# Patient Record
Sex: MALE | Race: WHITE | Employment: STUDENT | URBAN - METROPOLITAN AREA
[De-identification: names, ages, dates, MRNs, and addresses within clinical notes are randomized per-mention and may not be internally consistent; named-entity substitution may affect disease eponyms.]

---
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
## 2018-12-13 ENCOUNTER — OFFICE VISIT (OUTPATIENT)
Dept: ORTHOPEDICS | Facility: CLINIC | Age: 18
End: 2018-12-13
Payer: COMMERCIAL

## 2018-12-13 DIAGNOSIS — S01.81XA LACERATION OF SKIN OF FOREHEAD, INITIAL ENCOUNTER: Primary | ICD-10-CM

## 2018-12-13 DIAGNOSIS — M54.2 CERVICALGIA: ICD-10-CM

## 2018-12-13 RX ORDER — LIDOCAINE HYDROCHLORIDE AND EPINEPHRINE 10; 10 MG/ML; UG/ML
1 INJECTION, SOLUTION INFILTRATION; PERINEURAL
Status: SHIPPED | OUTPATIENT
Start: 2018-12-13

## 2018-12-13 RX ADMIN — LIDOCAINE HYDROCHLORIDE AND EPINEPHRINE 1 ML: 10; 10 INJECTION, SOLUTION INFILTRATION; PERINEURAL at 12:04

## 2018-12-13 NOTE — PROGRESS NOTES
"      SPORTS & ORTHOPEDIC WALK-IN VISIT 12/13/2018    Primary Care Physician: Rica  [employer] swim athlete here with . This morning during swim practice warm ups was underwater not realizing how close to the wall he was when his forehead collided into the wall/corner of the gutter. He continued to swim throughout the practice stating he just had a \"sore\" head. When he took of his cap at the end of the practice, the  noticed a laceration on his forehead. He is slightly concerned about a concussion.    Reason for visit:     What part of your body is injured / painful? Forehead    What caused the injury /pain? Collided with wall/gutter in pool    How long ago did your injury occur or pain begin? today    What are your most bothersome symptoms? Pain    How would you characterize your symptom?  aching    What makes your symptoms better? Nothing    What makes your symptoms worse? NA    Have you been previously seen for this problem? No    Medical History:    Any recent changes to your medical history? No    Any new medication prescribed since last visit? No    Have you had surgery on this body part before? 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 No    Do you have any vision problems? No    Do you have any chest pain or edema? No    Do you have any shortness of breath or wheezing?  No    Do you have stomach problems? No    Do you have any numbness or focal weakness? No    Do you have diabetes? No    Do you have problems with bleeding or clotting? No    Do you have an rashes or other skin lesions? No         "

## 2018-12-13 NOTE — PROGRESS NOTES
CHIEF COMPLAINT:  Pain and WOUND CARE of the Head       HISTORY OF PRESENT ILLNESS  Mr. Mayer is a pleasant 18 year old year old male who presents to clinic today for  evaluation of a possible concussion that occurred today.  Struck his head on edge of pool, forehead laceration.  Notes pressure in his head region.  Additionally has neck pain.      Grade: Sophomore  School: [employer]     Was this injury Sports related?  Yes: Swimming today   What sport were you participating in? Yes: Swimming   What hit your head?  Yes: Underwaters and misjudged the wall.     What type of playing surface were you on? Water  Was injury received during game or during practice? Practice  Loss of consciousness?:No  Retrograde amnesia?: no  Antegrade amnesia?: no  Evaluated by another provider?: No  How did you sleep the night of concussion?:NA  Are you more fatigued during the (school) day than normal? no  Would you and those around you describe your current behavior as back to baseline?: Yes  Assoc sx: Stiff neck.  Pressure in head    MEDICAL HISTORY  Headaches: no  Migraines diagnosed by health care provider: no  Learning disability: no  Psychiatric disorder:no  Seizure disorder: no  Have you ever had a concussion or had any symptoms that may have occurred as a result of a head injury?:No concussino    Past Medical History:  Past Medical History:   Diagnosis Date     Uncomplicated asthma        Current Medications:  Are you taking any medications other than those listed below, including over the counter medications?  No     Current outpatient prescriptions:    Current Outpatient Medications:      desloratadine (CLARINEX) 5 MG tablet, Take 5 mg by mouth daily, Disp: , Rfl:      fluticasone-salmeterol (ADVAIR) 100-50 MCG/DOSE diskus inhaler, Inhale 1 puff into the lungs every 12 hours, Disp: , Rfl:     Allergies:  Associated allergic manifestations: No    Surgical History:  No past surgical history on file.    Social History:  Who  "else lives at home? Roommates 3    Family History:  Migraines:no  Learning disability:no  Psychiatric disorder: no  Seizure disorder: no    REVIEW OF SYSTEMS  CONSTITUTIONAL:NEGATIVE for fever, chills, change in weight  INTEGUMENTARY/SKIN: NEGATIVE for worrisome rashes, moles or lesions  MUSCULOSKELETAL:See HPI above  NEURO: NEGATIVE for weakness, dizziness or paresthesias    Graded Symptom Checklist (0-6):  Headache 0  \"Pressure in Head\" 3  Neck pain 2  Nausea or Vomiting 0  Dizziness 0  Blurred Vision 0  Balance Problems 0  Sensitivity to Light 0  Sensitivity to Noise 0  Feeling slowed down 0  Feeling like \"in a fog\" 0  \"Don't feel right\" 1  Difficulty concentrating 0  Difficulty remembering 0  Fatigue or low energy 0  Confusion 0  Drowsiness 0  Trouble falling asleep 0  More emotional 0  Irritability 0  Sadness 0  Nervous or anxious 0    Total number of symptoms: 3/22  Symptom severity score: 6/132    Do symptoms get worse with physical activity? No  Do symptoms get worse with mental activity? No    There were no vitals taken for this visit.    EXAM  GENERAL APPEARANCE:healthy, alert and no distress   SKIN: no suspicious lesions or rashes  NEURO: Normal strength and tone, mentation intact and speech normal  PSYCH:  mentation appears normal and affect normal/bright  HEENT:  - Head: 3 cm laceration central forehead just caudad to hairline.  - Painful Eye movements: No  - Convergence Testing: Normal (6-8cm)  - Visual Field Testing: normal  - BROOKE:Yes  - EOMI:Yes  - Nystagmus: No  - VOR Testing Vertical: normal  - VOR Testing Horizonontal: normal  - External Ear Canal:Normal  - Tympanic Membranes: deferred  - Oropharynx:Atraumatic  NECK:  Tenderness cervical paraspinal muscles. No midline tenderness  NEUROLOGIC:  - Cranial Nerves 2-12: intact  - Motor:Normal bulk and tone. Strength 5/5 and symmetric throughout upper extremities  - Sensation: Intact to light touch.  - Coordination:       Finger to Nose: normal  - " Balance Testing:      Rhomberg:Normal     Forward Tandem: two errors     Non-dominant single leg stance balance testing: few errors   - Gait: Walk in hallway at normal speed: Able     Immediate recall 15/15  Orientation 5/5  Concentration: Months 1/1, Total concentration   3 word - yes  4-word - yes  5 word - second sequence yes  7-word - second sequence yes    ASSESSMENT/PLAN    Diagnosis:  Laceration of forehead  Cervicalgia    No definitive diagnosis of concussion    Patient with only pressure at head, neck pain on GSC.  I suspect symptoms are secondary to head/neck trauma and less likely concussion at this time.  He will follow-up with his ATC for reevaluation in the upcoming days.  Tylenol PRN.  Ice to cervical region.  Look out for evolving symptom profile. Laceration repair today as procedure below details.     It was a pleasure seeing Frank today.    Large Joint Injection/Arthocentesis  Date/Time: 12/13/2018 12:04 PM  Performed by: Dwight Ortiz DO  Authorized by: Dwight Ortiz DO     Indications comment:  Forehead laceration    Medications:  1 mL lidocaine 1% with EPINEPHrine 1:100,000 1 %-1:927094  Medications comment:  5mL Lidocaine 1% with epinephrine was used for local anesthesia    Procedure discussed: discussed risks, benefits, and alternatives    Consent Given by:  Patient  Timeout: timeout called immediately prior to procedure    Prep: patient was prepped and draped in usual sterile fashion          Procedure - Laceration Repair of Forehead:  The wound was prepped and draped in sterile fashion. Anesthesia was achieved with (5) mL of (1% lidocaine with epinephrine). The wound was irrigated (with 200cc NS) and explored. There were no foreign bodies or devitalized tissue present. The wound was reapproximated with five 5-0 vicryl interrupted sutures.There was excellent reapproximation of the wound edges.The patient tolerated the procedure without complication. Wound was dressed with antibiotic  ointment and gauze.  Instructions provided for care.    Dwight Ortiz DO, [facility]  Primary Care Sports Medicine

## 2018-12-13 NOTE — LETTER
"12/13/2018       RE: Frank Mayer  [address]  [address]  [facility] [address]     Dear Colleague,    Thank you for referring your patient, Frank Mayer, to the [facility] SPORTS AND ORTHOPAEDIC WALK IN CLINIC at [facility]. Please see a copy of my visit note below.          SPORTS & ORTHOPEDIC WALK-IN VISIT 12/13/2018    Primary Care Physician: Rica  [employer] swim athlete here with . This morning during swim practice warm ups was underwater not realizing how close to the wall he was when his forehead collided into the wall/corner of the gutter. He continued to swim throughout the practice stating he just had a \"sore\" head. When he took of his cap at the end of the practice, the  noticed a laceration on his forehead. He is slightly concerned about a concussion.    Reason for visit:     What part of your body is injured / painful? Forehead    What caused the injury /pain? Collided with wall/gutter in pool    How long ago did your injury occur or pain begin? today    What are your most bothersome symptoms? Pain    How would you characterize your symptom?  aching    What makes your symptoms better? Nothing    What makes your symptoms worse? NA    Have you been previously seen for this problem? No    Medical History:    Any recent changes to your medical history? No    Any new medication prescribed since last visit? No    Have you had surgery on this body part before? 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 No    Do you have any vision problems? No    Do you have any chest pain or edema? No    Do you have any shortness of breath or wheezing?  No    Do you have stomach problems? No    Do you have any numbness or focal weakness? No    Do you have diabetes? No    Do you have problems with bleeding or clotting? No    Do you have an " rashes or other skin lesions? No           CHIEF COMPLAINT:  Pain and WOUND CARE of the Head       HISTORY OF PRESENT ILLNESS  Mr. Mayer is a pleasant 18 year old year old male who presents to clinic today for  evaluation of a possible concussion that occurred today.  Struck his head on edge of pool, forehead laceration.  Notes pressure in his head region.  Additionally has neck pain.      Grade: Sophomore  School: [employer]     Was this injury Sports related?  Yes: Swimming today   What sport were you participating in? Yes: Swimming   What hit your head?  Yes: Underwaters and misjudged the wall.     What type of playing surface were you on? Water  Was injury received during game or during practice? Practice  Loss of consciousness?:No  Retrograde amnesia?: no  Antegrade amnesia?: no  Evaluated by another provider?: No  How did you sleep the night of concussion?:NA  Are you more fatigued during the (school) day than normal? no  Would you and those around you describe your current behavior as back to baseline?: Yes  Assoc sx: Stiff neck.  Pressure in head    MEDICAL HISTORY  Headaches: no  Migraines diagnosed by health care provider: no  Learning disability: no  Psychiatric disorder:no  Seizure disorder: no  Have you ever had a concussion or had any symptoms that may have occurred as a result of a head injury?:No concussino    Past Medical History:  Past Medical History:   Diagnosis Date     Uncomplicated asthma        Current Medications:  Are you taking any medications other than those listed below, including over the counter medications?  No     Current outpatient prescriptions:    Current Outpatient Medications:      desloratadine (CLARINEX) 5 MG tablet, Take 5 mg by mouth daily, Disp: , Rfl:      fluticasone-salmeterol (ADVAIR) 100-50 MCG/DOSE diskus inhaler, Inhale 1 puff into the lungs every 12 hours, Disp: , Rfl:     Allergies:  Associated allergic manifestations: No    Surgical History:  No past surgical  "history on file.    Social History:  Who else lives at home? Roommates 3    Family History:  Migraines:no  Learning disability:no  Psychiatric disorder: no  Seizure disorder: no    REVIEW OF SYSTEMS  CONSTITUTIONAL:NEGATIVE for fever, chills, change in weight  INTEGUMENTARY/SKIN: NEGATIVE for worrisome rashes, moles or lesions  MUSCULOSKELETAL:See HPI above  NEURO: NEGATIVE for weakness, dizziness or paresthesias    Graded Symptom Checklist (0-6):  Headache 0  \"Pressure in Head\" 3  Neck pain 2  Nausea or Vomiting 0  Dizziness 0  Blurred Vision 0  Balance Problems 0  Sensitivity to Light 0  Sensitivity to Noise 0  Feeling slowed down 0  Feeling like \"in a fog\" 0  \"Don't feel right\" 1  Difficulty concentrating 0  Difficulty remembering 0  Fatigue or low energy 0  Confusion 0  Drowsiness 0  Trouble falling asleep 0  More emotional 0  Irritability 0  Sadness 0  Nervous or anxious 0    Total number of symptoms: 3/22  Symptom severity score: 6/132    Do symptoms get worse with physical activity? No  Do symptoms get worse with mental activity? No    There were no vitals taken for this visit.    EXAM  GENERAL APPEARANCE:healthy, alert and no distress   SKIN: no suspicious lesions or rashes  NEURO: Normal strength and tone, mentation intact and speech normal  PSYCH:  mentation appears normal and affect normal/bright  HEENT:  - Head: 3 cm laceration central forehead just caudad to hairline.  - Painful Eye movements: No  - Convergence Testing: Normal (6-8cm)  - Visual Field Testing: normal  - BROOKE:Yes  - EOMI:Yes  - Nystagmus: No  - VOR Testing Vertical: normal  - VOR Testing Horizonontal: normal  - External Ear Canal:Normal  - Tympanic Membranes: deferred  - Oropharynx:Atraumatic  NECK:  Tenderness cervical paraspinal muscles. No midline tenderness  NEUROLOGIC:  - Cranial Nerves 2-12: intact  - Motor:Normal bulk and tone. Strength 5/5 and symmetric throughout upper extremities  - Sensation: Intact to light touch.  - " Coordination:       Finger to Nose: normal  - Balance Testing:      Rhomberg:Normal     Forward Tandem: two errors     Non-dominant single leg stance balance testing: few errors   - Gait: Walk in hallway at normal speed: Able     Immediate recall 15/15  Orientation 5/5  Concentration: Months 1/1, Total concentration   3 word - yes  4-word - yes  5 word - second sequence yes  7-word - second sequence yes    ASSESSMENT/PLAN    Diagnosis:  Laceration of forehead  Cervicalgia    No definitive diagnosis of concussion    Patient with only pressure at head, neck pain on GSC.  I suspect symptoms are secondary to head/neck trauma and less likely concussion at this time.  He will follow-up with his ATC for reevaluation in the upcoming days.  Tylenol PRN.  Ice to cervical region.  Look out for evolving symptom profile. Laceration repair today as procedure below details.     It was a pleasure seeing Frank today.    Large Joint Injection/Arthocentesis  Date/Time: 12/13/2018 12:04 PM  Performed by: Dwight Ortiz DO  Authorized by: Dwight Ortiz DO     Indications comment:  Forehead laceration    Medications:  1 mL lidocaine 1% with EPINEPHrine 1:100,000 1 %-1:916799  Medications comment:  5mL Lidocaine 1% with epinephrine was used for local anesthesia    Procedure discussed: discussed risks, benefits, and alternatives    Consent Given by:  Patient  Timeout: timeout called immediately prior to procedure    Prep: patient was prepped and draped in usual sterile fashion          Procedure - Laceration Repair of Forehead:  The wound was prepped and draped in sterile fashion. Anesthesia was achieved with (5) mL of (1% lidocaine with epinephrine). The wound was irrigated (with 200cc NS) and explored. There were no foreign bodies or devitalized tissue present. The wound was reapproximated with five 5-0 vicryl interrupted sutures.There was excellent reapproximation of the wound edges.The patient tolerated the procedure without  complication. Wound was dressed with antibiotic ointment and gauze.  Instructions provided for care.    Dwight Ortiz DO, [facility]  Primary Care Sports Medicine      Again, thank you for allowing me to participate in the care of your patient.      Sincerely,    Dwight Ortiz DO

## 2018-12-13 NOTE — LETTER
Date:December 17, 2018      Patient was self referred, no letter generated. Do not send.        [facility] Health Information

## 2020-09-03 DIAGNOSIS — Z11.59 SPECIAL SCREENING EXAMINATION FOR VIRAL DISEASE: ICD-10-CM

## 2020-09-04 LAB
COVID-19 ANTIBODY IGG: NEGATIVE
LAB TEST METHOD: NORMAL
SARS-COV-2 RNA SPEC QL NAA+PROBE: NOT DETECTED
SPECIMEN SOURCE: NORMAL

## 2020-09-05 DIAGNOSIS — Z11.59 SPECIAL SCREENING EXAMINATION FOR VIRAL DISEASE: ICD-10-CM

## 2020-09-06 LAB
SARS-COV-2 RNA SPEC QL NAA+PROBE: NOT DETECTED
SPECIMEN SOURCE: NORMAL

## (undated) RX ORDER — LIDOCAINE HYDROCHLORIDE AND EPINEPHRINE 10; 10 MG/ML; UG/ML
INJECTION, SOLUTION INFILTRATION; PERINEURAL
Status: DISPENSED
Start: 2018-12-13